# Patient Record
Sex: FEMALE | Employment: OTHER | URBAN - METROPOLITAN AREA
[De-identification: names, ages, dates, MRNs, and addresses within clinical notes are randomized per-mention and may not be internally consistent; named-entity substitution may affect disease eponyms.]

---

## 2023-07-28 ENCOUNTER — OFFICE VISIT (OUTPATIENT)
Dept: AUDIOLOGY | Facility: CLINIC | Age: 78
End: 2023-07-28
Payer: MEDICAID

## 2023-07-28 DIAGNOSIS — H90.3 ASYMMETRICAL SENSORINEURAL HEARING LOSS: Primary | ICD-10-CM

## 2023-07-28 DIAGNOSIS — H93.11 TINNITUS OF RIGHT EAR: ICD-10-CM

## 2023-07-28 PROCEDURE — 92557 COMPREHENSIVE HEARING TEST: CPT | Mod: AB | Performed by: AUDIOLOGIST

## 2023-07-28 PROCEDURE — 92550 TYMPANOMETRY & REFLEX THRESH: CPT | Mod: AB | Performed by: AUDIOLOGIST

## 2023-07-28 NOTE — PROGRESS NOTES
AUDIOLOGY REPORT    SUBJECTIVE:  Lavon Pisano is a 77 year old female who was seen in the Audiology Clinic at the [facility] for audiologic evaluation, referred by self. The patient reports gradually worsening hearing in the right ear. She also reports intermittent tinnitus in the right ear. The patient reports a history of occupational noise, but notes that she did not have any changes on annual hearing tests at work. Her concerns about the right ear are more recent. The patient reports that she does not have ear pain, ear pressure, history of ear problems or ear surgery, or family history of hearing loss. The patient notes difficulty with communication in a variety of listening situations.  The patient was accompanied today by her daughter-in-law.    OBJECTIVE:  Otoscopic exam indicates partial obstruction with cerumen in the right ear. The left ear is clear.     Pure Tone Thresholds assessed using conventional audiometry with good  reliability from 250-8000 Hz bilaterally using insert earphones and circumaural headphones     RIGHT:   moderate rising to mild  sloping to  moderately severe  sensorineural hearing loss    LEFT:     moderate rising to normal (0837-4677 Hz)  sloping to moderate sensorineural hearing loss  NOTE: significant asymmetry at 250-4000 Hz, with the right ear poorer    Tympanogram:    RIGHT: normal eardrum mobility    LEFT:   normal eardrum mobility    Reflexes (reported by stimulus ear):  RIGHT: Ipsilateral is absent at frequencies tested  RIGHT: Contralateral is present at elevated levels   LEFT:   Ipsilateral is present at normal levels  LEFT:   Contralateral is absent at frequencies tested    Speech Reception Threshold:    RIGHT: 45 dB HL    LEFT:   30 dB HL    Word Recognition Score:     RIGHT: 92% at 85 dB HL using NU-6 recorded word list.    LEFT:   80% at 70 dB HL using NU-6 recorded word list.      100% at 75 dB HL using NU-6 recorded word  list.      ASSESSMENT:     ICD-10-CM    1. Asymmetrical sensorineural hearing loss  H90.3 Cmpr[facility] Audiometry Thrshld Eval & Speech Recog (93249)     Tymps / Reflex   (23776)      2. Tinnitus of right ear  H93.11 Cmprhn Audiometry Thrshld Eval & Speech Recog (99190)     Tymps / Reflex   ([id number])          Today s results were discussed with the patient in detail.     PLAN:  Patient was counseled regarding hearing loss and impact on communication.  Patient is a candidate for amplification in the right ear with medical clearance. The patient is in the process of obtaining Medicaid coverage, and she would meet the standards of coverage for a hearing aid in the right ear only. Handout on good communication strategies and hearing aid use was given to patient. It is recommended that the patient follow up with ENT regarding the asymmetrical sensorineural hearing loss. She was provided with scheduling information.  Please call this clinic with questions regarding these results or recommendations.      Rory Stewart, CCC-A  [place] Licensed Audiologist #[id number]  7/28/2023